# Patient Record
Sex: FEMALE | Race: WHITE | Employment: PART TIME | URBAN - METROPOLITAN AREA
[De-identification: names, ages, dates, MRNs, and addresses within clinical notes are randomized per-mention and may not be internally consistent; named-entity substitution may affect disease eponyms.]

---
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
## 2019-01-08 ENCOUNTER — OFFICE VISIT (OUTPATIENT)
Dept: PSYCHOLOGY | Age: 19
End: 2019-01-08
Payer: COMMERCIAL

## 2019-01-08 VITALS — WEIGHT: 171 LBS

## 2019-01-08 DIAGNOSIS — F39 MOOD DISORDER (HCC): Primary | ICD-10-CM

## 2019-01-08 PROCEDURE — 90832 PSYTX W PT 30 MINUTES: CPT | Performed by: PSYCHOLOGIST

## 2019-01-08 ASSESSMENT — PATIENT HEALTH QUESTIONNAIRE - PHQ9
1. LITTLE INTEREST OR PLEASURE IN DOING THINGS: 1
SUM OF ALL RESPONSES TO PHQ QUESTIONS 1-9: 1
SUM OF ALL RESPONSES TO PHQ9 QUESTIONS 1 & 2: 1
2. FEELING DOWN, DEPRESSED OR HOPELESS: 0
SUM OF ALL RESPONSES TO PHQ QUESTIONS 1-9: 1

## 2019-01-08 ASSESSMENT — ANXIETY QUESTIONNAIRES
7. FEELING AFRAID AS IF SOMETHING AWFUL MIGHT HAPPEN: 0-NOT AT ALL SURE
1. FEELING NERVOUS, ANXIOUS, OR ON EDGE: 0-NOT AT ALL SURE
3. WORRYING TOO MUCH ABOUT DIFFERENT THINGS: 0-NOT AT ALL SURE
4. TROUBLE RELAXING: 0-NOT AT ALL SURE
GAD7 TOTAL SCORE: 0
2. NOT BEING ABLE TO STOP OR CONTROL WORRYING: 0-NOT AT ALL SURE
5. BEING SO RESTLESS THAT IT IS HARD TO SIT STILL: 0-NOT AT ALL SURE
6. BECOMING EASILY ANNOYED OR IRRITABLE: 0-NOT AT ALL SURE

## 2019-01-29 ENCOUNTER — OFFICE VISIT (OUTPATIENT)
Dept: PSYCHOLOGY | Age: 19
End: 2019-01-29
Payer: COMMERCIAL

## 2019-01-29 DIAGNOSIS — F39 MOOD DISORDER (HCC): Primary | ICD-10-CM

## 2019-01-29 PROCEDURE — 90832 PSYTX W PT 30 MINUTES: CPT | Performed by: PSYCHOLOGIST

## 2019-01-29 ASSESSMENT — PATIENT HEALTH QUESTIONNAIRE - PHQ9
1. LITTLE INTEREST OR PLEASURE IN DOING THINGS: 1
SUM OF ALL RESPONSES TO PHQ QUESTIONS 1-9: 2
SUM OF ALL RESPONSES TO PHQ9 QUESTIONS 1 & 2: 2
SUM OF ALL RESPONSES TO PHQ QUESTIONS 1-9: 2
2. FEELING DOWN, DEPRESSED OR HOPELESS: 1

## 2019-01-29 ASSESSMENT — ANXIETY QUESTIONNAIRES
GAD7 TOTAL SCORE: 0
2. NOT BEING ABLE TO STOP OR CONTROL WORRYING: 0-NOT AT ALL SURE
7. FEELING AFRAID AS IF SOMETHING AWFUL MIGHT HAPPEN: 0-NOT AT ALL SURE
5. BEING SO RESTLESS THAT IT IS HARD TO SIT STILL: 0-NOT AT ALL SURE
4. TROUBLE RELAXING: 0-NOT AT ALL SURE
3. WORRYING TOO MUCH ABOUT DIFFERENT THINGS: 0-NOT AT ALL SURE
6. BECOMING EASILY ANNOYED OR IRRITABLE: 0-NOT AT ALL SURE
1. FEELING NERVOUS, ANXIOUS, OR ON EDGE: 0-NOT AT ALL SURE

## 2019-02-15 ENCOUNTER — OFFICE VISIT (OUTPATIENT)
Dept: FAMILY MEDICINE CLINIC | Age: 19
End: 2019-02-15
Payer: COMMERCIAL

## 2019-02-15 VITALS
WEIGHT: 174 LBS | HEART RATE: 75 BPM | BODY MASS INDEX: 29.71 KG/M2 | OXYGEN SATURATION: 98 % | DIASTOLIC BLOOD PRESSURE: 60 MMHG | HEIGHT: 64 IN | SYSTOLIC BLOOD PRESSURE: 100 MMHG

## 2019-02-15 DIAGNOSIS — E28.2 PCOS (POLYCYSTIC OVARIAN SYNDROME): Primary | ICD-10-CM

## 2019-02-15 DIAGNOSIS — Z23 NEED FOR INFLUENZA VACCINATION: ICD-10-CM

## 2019-02-15 PROCEDURE — 90682 RIV4 VACC RECOMBINANT DNA IM: CPT | Performed by: FAMILY MEDICINE

## 2019-02-15 PROCEDURE — 90471 IMMUNIZATION ADMIN: CPT | Performed by: FAMILY MEDICINE

## 2019-02-15 PROCEDURE — 99213 OFFICE O/P EST LOW 20 MIN: CPT | Performed by: FAMILY MEDICINE

## 2019-02-26 ENCOUNTER — OFFICE VISIT (OUTPATIENT)
Dept: RHEUMATOLOGY | Age: 19
End: 2019-02-26
Payer: COMMERCIAL

## 2019-02-26 VITALS
SYSTOLIC BLOOD PRESSURE: 118 MMHG | DIASTOLIC BLOOD PRESSURE: 70 MMHG | WEIGHT: 176.6 LBS | BODY MASS INDEX: 30.31 KG/M2 | HEART RATE: 80 BPM

## 2019-02-26 DIAGNOSIS — M79.7 FIBROMYALGIA: Primary | ICD-10-CM

## 2019-02-26 PROCEDURE — 99213 OFFICE O/P EST LOW 20 MIN: CPT | Performed by: INTERNAL MEDICINE

## 2019-02-26 RX ORDER — MELOXICAM 7.5 MG/1
7.5 TABLET ORAL DAILY
Qty: 30 TABLET | Refills: 3 | Status: SHIPPED | OUTPATIENT
Start: 2019-02-26 | End: 2019-06-27 | Stop reason: SDUPTHER

## 2019-06-27 ENCOUNTER — OFFICE VISIT (OUTPATIENT)
Dept: RHEUMATOLOGY | Age: 19
End: 2019-06-27
Payer: COMMERCIAL

## 2019-06-27 VITALS
WEIGHT: 179.4 LBS | SYSTOLIC BLOOD PRESSURE: 110 MMHG | HEART RATE: 68 BPM | DIASTOLIC BLOOD PRESSURE: 72 MMHG | BODY MASS INDEX: 30.79 KG/M2

## 2019-06-27 DIAGNOSIS — M79.7 FIBROMYALGIA: Primary | ICD-10-CM

## 2019-06-27 PROCEDURE — 99213 OFFICE O/P EST LOW 20 MIN: CPT | Performed by: INTERNAL MEDICINE

## 2019-06-27 RX ORDER — MELOXICAM 7.5 MG/1
7.5 TABLET ORAL DAILY
Qty: 30 TABLET | Refills: 11 | Status: SHIPPED | OUTPATIENT
Start: 2019-06-27

## 2019-06-27 NOTE — PROGRESS NOTES
[facility]) Physicians  [facility]  Rheumatology Progress Note  Roman Mckeon MD            [] [facility]         [x]  [facility]                                      [address], [address]      [phone]                [phone]      Fax: [phone]                 Fax: [phone]           ______________________________________________________________________      Patient Name:  Keshia Oshea is a 20 y.o. patient     Gambia returns today for follow-up of her fibromyalgia. Since last seen, she has been stable. She tells me that she is only using the Mobic now on a as needed basis. She has been getting and having good efficacy with her compound cream.  She did well with her last semester. Did not have to miss any days of school. Sleep has been stable. As far as her psychiatric diagnoses are concerned, she has been given a new diagnosis of having autism. She has been stable. Past medical/surgical history, medications and allergies are reviewed and updated as appropriate.     Allergies   Allergen Reactions    Prozac [Fluoxetine Hcl] Other (See Comments)     Alter mental status    Sunscreens      W/ zinc oxide    Ranitidine Hcl Anxiety    Zyrtec [Cetirizine Hcl] Anxiety       Past Medical History:        Diagnosis Date    Anxiety     followed dy Dr. Molly Trevizo Infectious mononucleosis 5/13    Lumbar compression fracture ([facility] [address] 9/13    L1 and L2    Mood disorder (HCC)     possible bipolar    OCD (obsessive compulsive disorder)     PCOS (polycystic ovarian syndrome)     Scoliosis of thoracolumbar spine     Seasonal allergies        Past Surgical History:        Procedure Laterality Date    ADENOIDECTOMY Bilateral 2005    TYMPANOSTOMY TUBE PLACEMENT Bilateral 2003, 2005       Medications : Prior to Admission medications    Medication Sig Start Date End Date Taking? Authorizing Provider   meloxicam (MOBIC) 7.5 MG tablet Take 1 tablet by mouth daily 2/26/19   Isadora Reeder MD   metFORMIN (GLUCOPHAGE) 1000 MG tablet TAKE 1 TABLET BY MOUTH 2 TIMES DAILY (WITH MEALS) 2/5/19   Nithin Augustin MD   ibuprofen (ADVIL;MOTRIN) 600 MG tablet Take 1 tablet by mouth every 8 hours as needed for Pain 10/3/16   JEIMY Nix CNP         Subjective:      Review of Systems:    GENERAL: denies fatigue ; no unintentional weight loss  HEENT:   No nasal ulcers; no oral ulcers and sores  LUNGS: No shortness of breathing or  breathing difficulties  GI: No GI upset from medications  MUSCULOSKELETAL:see HPI  SKIN: denies having any new skin lesions since last office visit  LYMPHADENOPATHY: denies having had any recent infectious illness or lymphadenopathy      Objective:     Physical Exam:    /72 (Site: Left Upper Arm, Position: Sitting, Cuff Size: Medium Adult)   Pulse 68   Wt 179 lb 6.4 oz (81.4 kg)   BMI 30.79 kg/m²     GENERAL:Able to ambulate without any assistance. MUSCULOSKELETAL:   0/0 tender fibromyalgia points  ; no evidence of any synovitis, swelling, warmth noted over patient's upper or lower peripheral joints.         DATA:     Labs:     Lab Results   Component Value Date    WBC 9.6 10/15/2018    HGB 13.1 10/15/2018    HCT 39.7 10/15/2018    MCV 86.8 10/15/2018     10/15/2018       Chemistry        Component Value Date/Time     03/24/2017 1700    K 4.3 03/24/2017 1700     03/24/2017 1700    CO2 20 (L) 03/24/2017 1700    BUN 9 03/24/2017 1700    CREATININE 0.5 (L) 03/24/2017 1700        Component Value Date/Time    CALCIUM 9.7 03/24/2017 1700    ALKPHOS 75 03/24/2017 1700    AST 15 03/24/2017 1700    ALT 15 03/24/2017 1700    BILITOT <0.2 03/24/2017 1700          Lab Results   Component Value Date    SEDRATE 25 (H) 03/24/2017     Lab Results   Component Value Date    ZAN Negative 03/24/2017     No results found for: HAV, HEPAIGM, HEPBIGM, HEPBCAB, HBEAG, HEPCAB  Lab Results   Component Value Date    ZAN Negative 03/24/2017    ANAINT see below 03/24/2017     No results found for: DSDNAG, DSDNAIGGIFA  No results found for: SSAROAB, SSALAAB  No results found for: SMAB, RNPAB  No results found for: CENTABIGG  Lab Results   Component Value Date    SEDRATE 25 03/24/2017    RF <10.0 03/24/2017    CCPABIGG 3 03/24/2017     Lab Results   Component Value Date    ACE 44 03/24/2017     Lab Results   Component Value Date    VITD25 31.7 03/24/2017       Xr Hand Left Limited    Result Date: 3/24/2017  EXAMINATION: 2 VIEWS OF THE LEFT HAND 3/24/2017 1:49 pm COMPARISON: None. HISTORY: ORDERING SYSTEM PROVIDED HISTORY: Myalgia Acute. Initial study. FINDINGS: The alignment of the left hand is normal.  There are no periarticular erosions. No osteophyte formation is identified. Joint spacing is preserved. There is no periarticular osteopenia. Normal views of the left hand. Xr Hand Right Limited    Result Date: 3/24/2017  EXAMINATION: 2 VIEWS OF THE RIGHT HAND 3/24/2017 1:49 pm COMPARISON: None. HISTORY: ORDERING SYSTEM PROVIDED HISTORY: Myalgia FINDINGS: The alignment of the right hand is normal.  There is no fracture or dislocation. No periarticular erosions are appreciated. No joint space narrowing or osteophyte formation is appreciated. Bone mineralization is normal.     No acute osseous injury of the right hand. No significant arthropathy. Assessment/Plan:      Assessment/Plan:  Kadeem was seen today for follow-up. Diagnoses and all orders for this visit:    Fibromyalgia -continue with compound cream and meloxicam as needed. Good prognosis. Patient is currently stable. -     meloxicam (MOBIC) 7.5 MG tablet; Take 1 tablet by mouth daily       Return in about 6 months (around 12/27/2019).       The risks and benefits of my recommendations, as well as other treatment options, benefits and side effects were discussed with the patient today. Questions were answered. [address] [facility]  [facility]  [address] [place] [address] Dr Greco S [facility], [address] [place] [address]  EVCBQ:[phone]  F:[phone]    NOTE: This report is transcribed by using voice recognition software dragon. Every effort is made to ensure accuracy; however, inadvertent computerized transcription errors may be present.                  Brant Johnson MD, 6/27/2019 11:49 AM

## 2020-07-27 NOTE — TELEPHONE ENCOUNTER
Medication:   Requested Prescriptions     Pending Prescriptions Disp Refills    metFORMIN (GLUCOPHAGE) 1000 MG tablet [Pharmacy Med Name: metFORMIN HCL 1,000 MG TABLET] 180 tablet 0     Sig: TAKE ONE TABLET BY MOUTH TWICE A DAY WITH MEALS       Last Filled:  06/28/2019 #180 2 rf     Patient Phone Number: [phone] (home)     Last appt: 2/15/2019   Next appt: Visit date not found    Last Labs DM:   Lab Results   Component Value Date    LABA1C 5.5 02/07/2017